# Patient Record
Sex: MALE | Race: WHITE | NOT HISPANIC OR LATINO | Employment: OTHER | URBAN - METROPOLITAN AREA
[De-identification: names, ages, dates, MRNs, and addresses within clinical notes are randomized per-mention and may not be internally consistent; named-entity substitution may affect disease eponyms.]

---

## 2021-01-27 ENCOUNTER — NEW PATIENT (OUTPATIENT)
Dept: URBAN - METROPOLITAN AREA CLINIC 26 | Facility: CLINIC | Age: 66
End: 2021-01-27

## 2021-01-27 VITALS — WEIGHT: 195 LBS | HEIGHT: 66 IN | BODY MASS INDEX: 31.34 KG/M2

## 2021-01-27 DIAGNOSIS — H25.13: ICD-10-CM

## 2021-01-27 DIAGNOSIS — H33.312: ICD-10-CM

## 2021-01-27 DIAGNOSIS — H04.123: ICD-10-CM

## 2021-01-27 DIAGNOSIS — H02.833: ICD-10-CM

## 2021-01-27 DIAGNOSIS — H02.836: ICD-10-CM

## 2021-01-27 DIAGNOSIS — H40.013: ICD-10-CM

## 2021-01-27 DIAGNOSIS — H35.373: ICD-10-CM

## 2021-01-27 PROCEDURE — 92250 FUNDUS PHOTOGRAPHY W/I&R: CPT

## 2021-01-27 PROCEDURE — 92235 FLUORESCEIN ANGRPH MLTIFRAME: CPT

## 2021-01-27 PROCEDURE — 99204 OFFICE O/P NEW MOD 45 MIN: CPT

## 2021-01-27 PROCEDURE — 67145 PROPH RTA DTCHMNT PC: CPT

## 2021-01-27 PROCEDURE — 92134 CPTRZ OPH DX IMG PST SGM RTA: CPT

## 2021-01-27 ASSESSMENT — VISUAL ACUITY
OD_CC: 20/40
OS_CC: 20/50

## 2021-01-27 ASSESSMENT — TONOMETRY
OD_IOP_MMHG: 14
OS_IOP_MMHG: 14

## 2021-02-25 ENCOUNTER — POST OP-DILATION (OUTPATIENT)
Dept: URBAN - METROPOLITAN AREA CLINIC 26 | Facility: CLINIC | Age: 66
End: 2021-02-25

## 2021-02-25 DIAGNOSIS — H35.373: ICD-10-CM

## 2021-02-25 DIAGNOSIS — H33.312: ICD-10-CM

## 2021-02-25 DIAGNOSIS — H40.013: ICD-10-CM

## 2021-02-25 PROCEDURE — 92250 FUNDUS PHOTOGRAPHY W/I&R: CPT

## 2021-02-25 PROCEDURE — 99024 POSTOP FOLLOW-UP VISIT: CPT

## 2021-02-25 ASSESSMENT — VISUAL ACUITY: OS_CC: 20/70

## 2021-02-25 ASSESSMENT — TONOMETRY: OS_IOP_MMHG: 9

## 2021-05-10 ENCOUNTER — FOLLOW UP (OUTPATIENT)
Dept: URBAN - METROPOLITAN AREA CLINIC 26 | Facility: CLINIC | Age: 66
End: 2021-05-10

## 2021-05-10 DIAGNOSIS — H40.013: ICD-10-CM

## 2021-05-10 DIAGNOSIS — H35.373: ICD-10-CM

## 2021-05-10 DIAGNOSIS — H26.493: ICD-10-CM

## 2021-05-10 DIAGNOSIS — H43.812: ICD-10-CM

## 2021-05-10 DIAGNOSIS — H33.312: ICD-10-CM

## 2021-05-10 PROCEDURE — 92250 FUNDUS PHOTOGRAPHY W/I&R: CPT

## 2021-05-10 PROCEDURE — 92014 COMPRE OPH EXAM EST PT 1/>: CPT

## 2021-05-10 PROCEDURE — 92134 CPTRZ OPH DX IMG PST SGM RTA: CPT

## 2021-05-10 ASSESSMENT — TONOMETRY
OD_IOP_MMHG: 11
OS_IOP_MMHG: 14

## 2021-05-10 ASSESSMENT — VISUAL ACUITY
OD_SC: 20/30
OS_SC: 20/40-1

## 2024-06-27 ENCOUNTER — DASHBOARD ENCOUNTERS (OUTPATIENT)
Age: 69
End: 2024-06-27

## 2024-06-27 ENCOUNTER — OFFICE VISIT (OUTPATIENT)
Dept: URBAN - METROPOLITAN AREA CLINIC 9 | Facility: CLINIC | Age: 69
End: 2024-06-27

## 2024-06-27 ENCOUNTER — TELEPHONE ENCOUNTER (OUTPATIENT)
Dept: URBAN - METROPOLITAN AREA CLINIC 9 | Facility: CLINIC | Age: 69
End: 2024-06-27

## 2024-06-27 VITALS
HEIGHT: 66 IN | SYSTOLIC BLOOD PRESSURE: 111 MMHG | DIASTOLIC BLOOD PRESSURE: 73 MMHG | BODY MASS INDEX: 32.14 KG/M2 | WEIGHT: 200 LBS

## 2024-06-27 RX ORDER — GLUCOSAMINE/D3/BOSWELLIA SERRA 1500MG-400
1 TABLET TABLET ORAL ONCE A DAY
Status: ACTIVE | COMMUNITY

## 2024-06-27 RX ORDER — ALLOPURINOL 300 MG/1
1 TABLET TABLET ORAL ONCE A DAY
Status: ACTIVE | COMMUNITY

## 2024-06-27 RX ORDER — TORSEMIDE 10 MG/1
1 TABLET TABLET ORAL ONCE A DAY
Status: ACTIVE | COMMUNITY

## 2024-06-27 RX ORDER — METOPROLOL SUCCINATE 50 MG/1
25MG IN AM AND 50MG IN PM TABLET, FILM COATED, EXTENDED RELEASE ORAL TWICE DAILY
Status: ACTIVE | COMMUNITY

## 2024-06-27 RX ORDER — ASPIRIN 81 MG/1
1 TABLET TABLET, COATED ORAL ONCE A DAY
Status: ACTIVE | COMMUNITY

## 2024-06-27 RX ORDER — ROSUVASTATIN CALCIUM 5 MG/1
1/2 TABLET TABLET, COATED ORAL ONCE A DAY
Status: ACTIVE | COMMUNITY

## 2024-06-27 RX ORDER — WARFARIN SODIUM 4 MG/1
1 TABLET TABLET ORAL ONCE A DAY
Qty: 90 TABLET | Status: ACTIVE | COMMUNITY

## 2024-06-27 NOTE — HPI-TODAY'S VISIT:
EGD:   Colonoscopy: >10 y/a- [place]- no polyps.   Imaging/Studies/Procedures:   ----  PMH:  CAD s/p stent placed following with cardiology at [facility] Dr [place], A fib on warfarin, Mitral valve regurgitation and stenosis, Aortic stenosis, PAD  Family Hx:   GI Hx: The patient is a 68-year-old male who has a history of coronary heart disease with a stent placed two or three years ago. He also has a history of atrial fibrillation, for which he is on warfarin. Additionally, he has a leaky heart valve, mitral valve regurgitation, and aortic stenosis. The patient has had a colonoscopy over ten years ago in [place], with no polyps found. He has no family history of colon cancer. He has no current symptoms. Looking to undergo colonoscopy.

## 2024-07-26 ENCOUNTER — OFFICE VISIT (OUTPATIENT)
Dept: URBAN - METROPOLITAN AREA CLINIC 9 | Facility: CLINIC | Age: 69
End: 2024-07-26

## 2024-08-07 ENCOUNTER — TELEPHONE ENCOUNTER (OUTPATIENT)
Dept: URBAN - METROPOLITAN AREA CLINIC 9 | Facility: CLINIC | Age: 69
End: 2024-08-07

## 2024-10-17 ENCOUNTER — P2P PATIENT RECORD (OUTPATIENT)
Age: 69
End: 2024-10-17